# Patient Record
Sex: FEMALE | Race: WHITE | NOT HISPANIC OR LATINO
[De-identification: names, ages, dates, MRNs, and addresses within clinical notes are randomized per-mention and may not be internally consistent; named-entity substitution may affect disease eponyms.]

---

## 2021-10-08 ENCOUNTER — DASHBOARD ENCOUNTERS (OUTPATIENT)
Age: 71
End: 2021-10-08

## 2021-10-08 ENCOUNTER — OFFICE VISIT (OUTPATIENT)
Dept: URBAN - METROPOLITAN AREA CLINIC 72 | Facility: CLINIC | Age: 71
End: 2021-10-08
Payer: MEDICARE

## 2021-10-08 ENCOUNTER — WEB ENCOUNTER (OUTPATIENT)
Dept: URBAN - METROPOLITAN AREA CLINIC 72 | Facility: CLINIC | Age: 71
End: 2021-10-08

## 2021-10-08 VITALS
SYSTOLIC BLOOD PRESSURE: 131 MMHG | BODY MASS INDEX: 30.73 KG/M2 | DIASTOLIC BLOOD PRESSURE: 71 MMHG | WEIGHT: 191.2 LBS | HEART RATE: 75 BPM | HEIGHT: 66 IN | TEMPERATURE: 98.1 F | RESPIRATION RATE: 18 BRPM

## 2021-10-08 DIAGNOSIS — Z86.010 HISTORY OF COLON POLYPS: ICD-10-CM

## 2021-10-08 PROBLEM — 428283002 HISTORY OF POLYP OF COLON: Status: ACTIVE | Noted: 2021-10-08

## 2021-10-08 PROCEDURE — 99203 OFFICE O/P NEW LOW 30 MIN: CPT | Performed by: INTERNAL MEDICINE

## 2021-10-08 RX ORDER — ACETAMINOPHEN ER 650 MG TABLET,EXTENDED RELEASE 650 MG
2 TABLETS AS NEEDED TABLET, EXTENDED RELEASE ORAL
Status: ACTIVE | COMMUNITY

## 2021-10-08 RX ORDER — DICLOFENAC 35 MG/1
2 TABS CAPSULE ORAL TWICE A DAY
Status: ACTIVE | COMMUNITY

## 2021-10-08 RX ORDER — ASPIRIN 325 MG
1 CAPSULE TABLET ORAL ONCE A DAY
Status: ACTIVE | COMMUNITY

## 2021-10-08 RX ORDER — ACYCLOVIR 400 MG/1
1 TABLET TABLET ORAL TWICE A DAY
Status: ACTIVE | COMMUNITY

## 2021-10-08 RX ORDER — PSEUDOEPHEDRINE HCL 30 MG
1 TABLET TABLET ORAL ONCE A DAY
Status: ACTIVE | COMMUNITY

## 2021-10-08 RX ORDER — CALCIUM CARBONATE/VITAMIN D3 600 MG-10
1 CAPSULE TABLET ORAL ONCE A DAY
Status: ACTIVE | COMMUNITY

## 2021-10-08 RX ORDER — MAGNESIUM 200 MG
2 TABLETS WITH A MEAL TABLET ORAL ONCE A DAY
Status: ACTIVE | COMMUNITY

## 2021-10-08 RX ORDER — LORATADINE 10 MG
1 TABLET TABLET ORAL ONCE A DAY
Status: ACTIVE | COMMUNITY

## 2021-10-08 RX ORDER — FEXOFENADINE HCL 180 MG/1
1 TABLET SWALLOW WHOLE WITH WATER; DO NOT TAKE WITH FRUIT JUICES TABLET ORAL ONCE A DAY
Status: ACTIVE | COMMUNITY

## 2021-10-08 RX ORDER — METHYLDOPA/HYDROCHLOROTHIAZIDE 250MG-25MG
AS DIRECTED TABLET ORAL
Status: ACTIVE | COMMUNITY

## 2021-10-08 RX ORDER — HYDROCHLOROTHIAZIDE 25 MG/1
1 TABLET IN THE MORNING TABLET ORAL ONCE A DAY
Status: ACTIVE | COMMUNITY

## 2021-10-08 RX ORDER — ATORVASTATIN CALCIUM 40 MG/1
1.5 TABLET TABLET, FILM COATED ORAL ONCE A DAY
Status: ACTIVE | COMMUNITY

## 2021-10-08 RX ORDER — TRAMADOL HYDROCHLORIDE 50 MG/1
1 TABLET AS NEEDED TABLET, FILM COATED ORAL ONCE A DAY
Status: ACTIVE | COMMUNITY

## 2021-10-08 RX ORDER — GABAPENTIN 300 MG/1
1 CAPSULE CAPSULE ORAL ONCE A DAY
Status: ACTIVE | COMMUNITY

## 2021-10-08 RX ORDER — HYDROXYZINE HYDROCHLORIDE 10 MG/1
AS DIRECTED TABLET, FILM COATED ORAL
Status: ACTIVE | COMMUNITY

## 2021-10-08 NOTE — HPI-TODAY'S VISIT:
Mrs. Hwang is a pleasant 70-year-old female who presents as a new patient for evaluation to establish care.She has history of colon polyps, her last colonoscopy was in 2020, she had 2 polyps on this and was told to follow-up in 1 year.  I do not have the details of that encounter.  She otherwise feels well and has no complaints today.  Has no GI symptoms currently.

## 2025-03-18 ENCOUNTER — OFFICE VISIT (OUTPATIENT)
Dept: URBAN - METROPOLITAN AREA CLINIC 72 | Facility: CLINIC | Age: 75
End: 2025-03-18

## 2025-03-18 ENCOUNTER — LAB OUTSIDE AN ENCOUNTER (OUTPATIENT)
Dept: URBAN - METROPOLITAN AREA CLINIC 72 | Facility: CLINIC | Age: 75
End: 2025-03-18

## 2025-03-18 RX ORDER — TRAMADOL HYDROCHLORIDE 50 MG/1
1 TABLET AS NEEDED TABLET, FILM COATED ORAL ONCE A DAY
Status: ACTIVE | COMMUNITY

## 2025-03-18 RX ORDER — ACYCLOVIR 400 MG/1
1 TABLET TABLET ORAL TWICE A DAY
Status: ACTIVE | COMMUNITY

## 2025-03-18 RX ORDER — DICLOFENAC 35 MG/1
2 TABS CAPSULE ORAL TWICE A DAY
Status: ACTIVE | COMMUNITY

## 2025-03-18 RX ORDER — ATORVASTATIN CALCIUM 40 MG/1
1.5 TABLET TABLET, FILM COATED ORAL ONCE A DAY
Status: ACTIVE | COMMUNITY

## 2025-03-18 RX ORDER — ASPIRIN 325 MG
1 CAPSULE TABLET ORAL ONCE A DAY
Status: ACTIVE | COMMUNITY

## 2025-03-18 RX ORDER — HYDROXYZINE HYDROCHLORIDE 10 MG/1
AS DIRECTED TABLET, FILM COATED ORAL
Status: ACTIVE | COMMUNITY

## 2025-03-18 RX ORDER — GABAPENTIN 300 MG/1
1 CAPSULE CAPSULE ORAL ONCE A DAY
Status: ON HOLD | COMMUNITY

## 2025-03-18 RX ORDER — ACETAMINOPHEN ER 650 MG TABLET,EXTENDED RELEASE 650 MG
2 TABLETS AS NEEDED TABLET, EXTENDED RELEASE ORAL
Status: ACTIVE | COMMUNITY

## 2025-03-18 RX ORDER — FEXOFENADINE HCL 180 MG/1
1 TABLET SWALLOW WHOLE WITH WATER; DO NOT TAKE WITH FRUIT JUICES TABLET ORAL ONCE A DAY
Status: ON HOLD | COMMUNITY

## 2025-03-18 RX ORDER — CALCIUM CARBONATE/VITAMIN D3 600 MG-10
1 CAPSULE TABLET ORAL ONCE A DAY
Status: ACTIVE | COMMUNITY

## 2025-03-18 RX ORDER — HYDROCHLOROTHIAZIDE 25 MG/1
1 TABLET IN THE MORNING TABLET ORAL ONCE A DAY
Status: ACTIVE | COMMUNITY

## 2025-03-18 RX ORDER — MAGNESIUM 200 MG
2 TABLETS WITH A MEAL TABLET ORAL ONCE A DAY
Status: ACTIVE | COMMUNITY

## 2025-03-18 RX ORDER — PSEUDOEPHEDRINE HCL 30 MG
1 TABLET TABLET ORAL ONCE A DAY
Status: ACTIVE | COMMUNITY

## 2025-03-18 RX ORDER — LORATADINE 10 MG
1 TABLET TABLET ORAL ONCE A DAY
Status: ACTIVE | COMMUNITY

## 2025-03-18 RX ORDER — METHYLDOPA/HYDROCHLOROTHIAZIDE 250MG-25MG
AS DIRECTED TABLET ORAL
Status: ACTIVE | COMMUNITY

## 2025-03-18 NOTE — EXAM-PHYSICAL EXAM
General--no acute distress, resting comfortably Eyes--anicteric, no pallor HENT--normocephalic, atraumatic head Neck--no lymphadenopathy, symmetric Chest--non labored breathing, equal rise Abdomen--soft, non tender, non distended, no organomegaly Ext: RIDDHI, no obvious sores or rashes

## 2025-03-18 NOTE — HPI-TODAY'S VISIT:
Mrs. Hwang is a 74-year-old who presents as a new patient for consultation for colonoscopy.  She was last seen in office in 2021 to establish care.  Colonoscopy 10/28/2020 done by another local gastroenterologist showed aphthous like ulcers, fair bowel preparation and a rectosigmoid polyp.  Repeat colonoscopy was recommended in 3 years.  Biopsy showed polyps be hyperplastic in the random biopsy showed focal active colitis.  In 2019 she had a 2-1/2 cm polyp and a ascending colon polyp due for repeat in 1 year.  This returned as hyperplastic ascending colon and a sessile serrated adenoma in the descending distal colon.

## 2025-03-24 ENCOUNTER — OFFICE VISIT (OUTPATIENT)
Dept: URBAN - METROPOLITAN AREA CLINIC 72 | Facility: CLINIC | Age: 75
End: 2025-03-24

## 2025-04-23 PROBLEM — 428283002: Status: ACTIVE | Noted: 2025-04-23

## 2025-04-24 ENCOUNTER — LAB OUTSIDE AN ENCOUNTER (OUTPATIENT)
Dept: URBAN - METROPOLITAN AREA CLINIC 72 | Facility: CLINIC | Age: 75
End: 2025-04-24

## 2025-04-24 ENCOUNTER — OFFICE VISIT (OUTPATIENT)
Dept: URBAN - METROPOLITAN AREA CLINIC 72 | Facility: CLINIC | Age: 75
End: 2025-04-24
Payer: MEDICARE

## 2025-04-24 DIAGNOSIS — T50.995A ADVERSE EFFECT OF OTHER DRUGS, MEDICAMENTS AND BIOLOGICAL SUBSTANCES, INITIAL ENCOUNTER: ICD-10-CM

## 2025-04-24 DIAGNOSIS — Z86.0101 PERSONAL HISTORY OF ADENOMATOUS AND SERRATED COLON POLYPS: ICD-10-CM

## 2025-04-24 DIAGNOSIS — K59.03 DRUG-INDUCED CONSTIPATION: ICD-10-CM

## 2025-04-24 DIAGNOSIS — Z09 ENCOUNTER FOR FOLLOW-UP: ICD-10-CM

## 2025-04-24 PROBLEM — 21782001: Status: ACTIVE | Noted: 2025-04-24

## 2025-04-24 PROCEDURE — 99214 OFFICE O/P EST MOD 30 MIN: CPT

## 2025-04-24 RX ORDER — ACETAMINOPHEN ER 650 MG TABLET,EXTENDED RELEASE 650 MG
2 TABLETS AS NEEDED TABLET, EXTENDED RELEASE ORAL
Status: ACTIVE | COMMUNITY

## 2025-04-24 RX ORDER — ATORVASTATIN CALCIUM 80 MG/1
1 TABLET TABLET, FILM COATED ORAL ONCE A DAY
Status: ACTIVE | COMMUNITY

## 2025-04-24 RX ORDER — MAGNESIUM 200 MG
2 TABLETS WITH A MEAL TABLET ORAL ONCE A DAY
Status: ON HOLD | COMMUNITY

## 2025-04-24 RX ORDER — FEXOFENADINE HCL 180 MG/1
1 TABLET SWALLOW WHOLE WITH WATER; DO NOT TAKE WITH FRUIT JUICES TABLET ORAL ONCE A DAY
Status: ON HOLD | COMMUNITY

## 2025-04-24 RX ORDER — HYDROXYZINE HYDROCHLORIDE 10 MG/1
AS DIRECTED TABLET, FILM COATED ORAL
Status: ACTIVE | COMMUNITY

## 2025-04-24 RX ORDER — PSEUDOEPHEDRINE HCL 30 MG
1 TABLET TABLET ORAL ONCE A DAY
Status: ON HOLD | COMMUNITY

## 2025-04-24 RX ORDER — CALCIUM CARBONATE/VITAMIN D3 600 MG-10
1 CAPSULE TABLET ORAL ONCE A DAY
Status: ACTIVE | COMMUNITY

## 2025-04-24 RX ORDER — ASPIRIN 325 MG
1 CAPSULE TABLET ORAL ONCE A DAY
Status: ACTIVE | COMMUNITY

## 2025-04-24 RX ORDER — DOCUSATE SODIUM 100 MG/1
ONE TABLET CAPSULE, LIQUID FILLED ORAL TWICE A DAY
Qty: 180 | Refills: 3 | OUTPATIENT
Start: 2025-04-24 | End: 2026-04-19

## 2025-04-24 RX ORDER — HYDROCHLOROTHIAZIDE 25 MG/1
1 TABLET IN THE MORNING TABLET ORAL ONCE A DAY
Status: ACTIVE | COMMUNITY

## 2025-04-24 RX ORDER — GABAPENTIN 300 MG/1
2 CAPSULES CAPSULE ORAL TWICE A DAY
Status: ACTIVE | COMMUNITY

## 2025-04-24 RX ORDER — DICLOFENAC SODIUM 75 MG/1
1 TABLET AS NEEDED TABLET, DELAYED RELEASE ORAL TWICE A DAY
Status: ACTIVE | COMMUNITY

## 2025-04-24 RX ORDER — TRAMADOL HYDROCHLORIDE 50 MG/1
1 TABLET AS NEEDED TABLET, FILM COATED ORAL ONCE A DAY
Status: ACTIVE | COMMUNITY

## 2025-04-24 RX ORDER — DICLOFENAC 35 MG/1
2 TABS CAPSULE ORAL TWICE A DAY
Status: ON HOLD | COMMUNITY

## 2025-04-24 RX ORDER — ACYCLOVIR 400 MG/1
1 TABLET TABLET ORAL TWICE A DAY
Status: ACTIVE | COMMUNITY

## 2025-04-24 RX ORDER — LORATADINE 10 MG
1 TABLET TABLET ORAL ONCE A DAY
Status: ACTIVE | COMMUNITY

## 2025-04-24 RX ORDER — METHYLDOPA/HYDROCHLOROTHIAZIDE 250MG-25MG
AS DIRECTED TABLET ORAL
Status: ACTIVE | COMMUNITY

## 2025-04-24 NOTE — HPI-OTHER HISTORIES
Procedures: 10/20/2020-colonoscopy ([facility]): Cecum had considerable fecal material which could not be effectively washed or suction.  Rectum was unremarkable.  Retroflexion was performed.  At the rectosigmoid junction a 4 mm sessile polyp was noted and removed with multiple cold biopsies in its entirety.  Scattered throughout the transverse colon were multiple aphthous likely ulcers.  Biopsies were taken of the transverse colon.  Ascending colon was unremarkable.  Path: Mild focal active colitis in the transverse colon biopsy.  Rectosigmoid biopsy was a hyperplastic polyp.  Recall colonoscopy in 3 years.  Due 10/2023.  9/12/2019-colonoscopy: Rectum was unremarkable.  Sigmoid colon unremarkable.  Proximal descending/distal transverse colon a laterally spreading flat 2 to 2-1/2 cm polyp was noted.  Narrowband imaging was used to inspect the surface of the polyp.  Biopsies were taken.  It was then elected to lift the polyp with 3 cc of methylene blue saline solution.  Piecemeal snare cautery polypectomy was then performed with all visible lesion removed.  Spot tattoo  localization was then performed.  Distal ascending colon 5 mm sessile polyp.  Path: Proximal descending polypectomy was a sessile serrated adenoma.  Distal ascending polyp was a hyperplastic polyp.  Recall 1 year.  Due 9/2020.

## 2025-04-24 NOTE — HPI-TODAY'S VISIT:
Patient is a 74-year-old female last seen in the office on 10/8/2021 to establish service.  She is here today to schedule a colonoscopy.  Patient is seen today with her . Generally, patient tends to constipation. She has been given a stool softner daily because she started Zepbound on Monday. She stools every 3 days or so. abdominal pain since two hours ago associated with nausea. Had fever 102 around 5pm and took Tylenol at that time. LMP 12/23. No PMH.

## 2025-06-28 ENCOUNTER — WEB ENCOUNTER (OUTPATIENT)
Dept: URBAN - METROPOLITAN AREA CLINIC 72 | Facility: CLINIC | Age: 75
End: 2025-06-28

## 2025-07-10 ENCOUNTER — OFFICE VISIT (OUTPATIENT)
Dept: URBAN - METROPOLITAN AREA MEDICAL CENTER 40 | Facility: MEDICAL CENTER | Age: 75
End: 2025-07-10

## 2025-07-21 ENCOUNTER — WEB ENCOUNTER (OUTPATIENT)
Dept: URBAN - METROPOLITAN AREA CLINIC 72 | Facility: CLINIC | Age: 75
End: 2025-07-21

## 2025-07-24 ENCOUNTER — TELEPHONE ENCOUNTER (OUTPATIENT)
Dept: URBAN - METROPOLITAN AREA CLINIC 72 | Facility: CLINIC | Age: 75
End: 2025-07-24

## 2025-07-30 ENCOUNTER — OFFICE VISIT (OUTPATIENT)
Dept: URBAN - METROPOLITAN AREA CLINIC 72 | Facility: CLINIC | Age: 75
End: 2025-07-30